# Patient Record
Sex: MALE | Race: WHITE
[De-identification: names, ages, dates, MRNs, and addresses within clinical notes are randomized per-mention and may not be internally consistent; named-entity substitution may affect disease eponyms.]

---

## 2018-12-20 ENCOUNTER — HOSPITAL ENCOUNTER (OUTPATIENT)
Dept: HOSPITAL 94 - WOUND CARE | Age: 78
Discharge: HOME | End: 2018-12-20
Attending: SURGERY
Payer: MEDICARE

## 2018-12-20 DIAGNOSIS — I25.10: ICD-10-CM

## 2018-12-20 DIAGNOSIS — I10: ICD-10-CM
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
## 2018-12-20 DIAGNOSIS — E11.622: ICD-10-CM

## 2018-12-20 DIAGNOSIS — T81.89XD: Primary | ICD-10-CM

## 2018-12-20 DIAGNOSIS — L98.412: ICD-10-CM

## 2018-12-20 DIAGNOSIS — E78.5: ICD-10-CM

## 2018-12-20 DIAGNOSIS — E66.01: ICD-10-CM

## 2018-12-20 PROCEDURE — 11042 DBRDMT SUBQ TIS 1ST 20SQCM/<: CPT

## 2018-12-28 ENCOUNTER — HOSPITAL ENCOUNTER (OUTPATIENT)
Dept: HOSPITAL 94 - WOUND CARE | Age: 78
Discharge: HOME | End: 2018-12-28
Attending: SURGERY
Payer: MEDICARE

## 2018-12-28 DIAGNOSIS — I25.10: ICD-10-CM
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
## 2018-12-28 DIAGNOSIS — E66.01: ICD-10-CM

## 2018-12-28 DIAGNOSIS — I10: ICD-10-CM

## 2018-12-28 DIAGNOSIS — T81.89XD: Primary | ICD-10-CM

## 2018-12-28 DIAGNOSIS — E11.622: ICD-10-CM

## 2018-12-28 DIAGNOSIS — E78.5: ICD-10-CM

## 2018-12-28 DIAGNOSIS — L98.412: ICD-10-CM

## 2018-12-28 PROCEDURE — 97597 DBRDMT OPN WND 1ST 20 CM/<: CPT

## 2018-12-28 NOTE — NUR
Patient arrived safely into [facility] via wheelchair. Patient admitted to outpatient wound care 
for physician visit. Dressing removed, wound cleansed and lidocaine applied per order. 
Patient assessed for changes in conditions, medications and medical history.

 

0940-Dr. Jones at bedside accompanied by RN.  Wound assessed, time out performed by MD/RN.  
Wound debrided as detailed in the physician progress/procedure note.  Plan of care discussed 
with patient.  Dressings placed per MD orders.



Patient instructed on the signs and symptoms of infection and to call the Wound Center if 
any occur or to go to the ED if we are closed:

 Increased pain in wound

 Increase in drainage from the wound

 Redness in the skin surrounding the wound

 Bleeding from the wound

 Temperature of 101 or greater



Patient instructed that the weight of their body puts a large amount of pressure on their 
wounds. This pressure keeps the new tissue from growing and inhibits new blood vessels from 
forming. Explained that, if they continue to bear weight on a body part that has a wound, 
the time it takes to heal the wound increases, the wound may get worse or the wound may not 
heal at all.

Patient verbalized understanding of all discharge instructions and plan of care and 
ambulated independently out to [facility] in stable condition with no sign or symptom of distress 
at time of discharge.


-------------------------------------------------------------------------------

Addendum: 12/28/18 at 1444 by Crystal Benitez RN

-------------------------------------------------------------------------------

Amended: Links added.

## 2019-01-04 NOTE — NUR
Patient arrived via wheelchair from [facility] and was admitted to outpatient wound care for 
physician visit with Kahlil Jones MD. Dressing removed, wound cleansed and lidocaine applied 
per order. Patient assessed for changes in conditions, medications and medical history. 



1145 - Dr. Jones at bedside accompanied by RN.  Wound assessed by MD and new orders written. 
 Plan of care discussed with patient.  Dressings placed per MD orders.



Patient instructed on the signs and symptoms of infection and to call the Wound Center if 
any occur or to go to the ED if we are closed:

 Increased pain in wound

 Increase in drainage from the wound

 Redness in the skin surrounding the wound

 Bleeding from the wound

 Temperature of 101 or greater



Patient instructed that the weight of their body puts a large amount of pressure on their 
wounds. This pressure keeps the new tissue from growing and inhibits new blood vessels from 
forming. Explained that, if they continue to bear weight on a body part that has a wound, 
the time it takes to heal the wound increases, the wound may get worse or the wound may not 
heal at all.



Patient verbalized understanding of all discharge instructions and plan of care and exited 
via wheelchair out to [facility] in stable condition with no sign or symptom of distress at time 
of discharge.  India vaughan [facility] updated on plan of care and orders faxed addressing their 
questions about dressing change frequency and activity level.